# Patient Record
Sex: FEMALE | URBAN - NONMETROPOLITAN AREA
[De-identification: names, ages, dates, MRNs, and addresses within clinical notes are randomized per-mention and may not be internally consistent; named-entity substitution may affect disease eponyms.]

---

## 2020-06-09 ENCOUNTER — APPOINTMENT (OUTPATIENT)
Dept: URBAN - NONMETROPOLITAN AREA CLINIC 39 | Age: 75
Setting detail: DERMATOLOGY
End: 2020-06-10

## 2020-06-09 DIAGNOSIS — L43.8 OTHER LICHEN PLANUS: ICD-10-CM

## 2020-06-09 PROCEDURE — OTHER ADDITIONAL NOTES: OTHER

## 2020-06-09 PROCEDURE — 99202 OFFICE O/P NEW SF 15 MIN: CPT

## 2020-06-09 PROCEDURE — OTHER MIPS QUALITY: OTHER

## 2020-06-09 PROCEDURE — OTHER COUNSELING: OTHER

## 2020-06-09 PROCEDURE — OTHER MEDICATION COUNSELING: OTHER

## 2020-06-09 PROCEDURE — OTHER PRESCRIPTION: OTHER

## 2020-06-09 ASSESSMENT — LOCATION DETAILED DESCRIPTION DERM
LOCATION DETAILED: LEFT DISTAL PRETIBIAL REGION
LOCATION DETAILED: RIGHT PROXIMAL PRETIBIAL REGION

## 2020-06-09 ASSESSMENT — LOCATION ZONE DERM: LOCATION ZONE: LEG

## 2020-06-09 ASSESSMENT — LOCATION SIMPLE DESCRIPTION DERM
LOCATION SIMPLE: RIGHT PRETIBIAL REGION
LOCATION SIMPLE: LEFT PRETIBIAL REGION

## 2020-06-09 NOTE — HPI: RASH
Additional History: Pt states “it’s been traveling around my body for the past 4 years.” Pt states “I had it on my chest first then it travelled to my left arm and now my legs.” Pt states her previous dermatologist, Dr. Alida Rios, at [place] did 2 biopsies, one shave biopsy and then the second time she did a punch biopsy. Pt states the biopsies were approximately 2 years ago. Pt states the biopsy came back as lichen planus. Additional History: Pt states “it’s been traveling around my body for the past 4 years.” Pt states “I had it on my chest first then it travelled to my left arm and now my legs.” Pt states her previous dermatologist, Dr. Alida Riso, at [place] did 2 biopsies, one shave biopsy and then the second time she did a punch biopsy. Pt states the biopsies were approximately 2 years ago. Pt states the biopsy came back as lichen planus.

## 2020-06-09 NOTE — PROCEDURE: MEDICATION COUNSELING
Xelnarinderz Pregnancy And Lactation Text: This medication is Pregnancy Category D and is not considered safe during pregnancy.  The risk during breast feeding is also uncertain.

## 2020-06-09 NOTE — PROCEDURE: ADDITIONAL NOTES
Additional Notes: Rash was punch biopsied by patient’s previous dermatologist, Dr. Alida Rios, on 04/03/19. Pathology report requested and reviewed with pt today. Pt report shows lichen planus. Patient states she has been treated in the past with IM Kenalog injections. Pt reports her rash never fully resolved but improved significantly. Pt states the rash began on her chest 2 years ago and has since presented on her left arm and now her legs. Pt states the rash has been on her legs for months. \\nPt to begin oral prednisone 60mg daily and follow up in 3 weeks. Will plan to wean pt slowly depending on improvement.
Detail Level: Detailed

## 2020-06-10 RX ORDER — PREDNISONE 20 MG/1
TABLET ORAL
Qty: 63 | Refills: 0 | Status: ERX | COMMUNITY
Start: 2020-06-10

## 2020-06-30 ENCOUNTER — RX ONLY (RX ONLY)
Age: 75
End: 2020-06-30

## 2020-06-30 RX ORDER — PREDNISONE 20 MG/1
TABLET ORAL
Qty: 21 | Refills: 0 | Status: ERX

## 2020-07-09 ENCOUNTER — APPOINTMENT (OUTPATIENT)
Dept: URBAN - NONMETROPOLITAN AREA CLINIC 39 | Age: 75
Setting detail: DERMATOLOGY
End: 2020-07-09

## 2020-07-09 DIAGNOSIS — L43.8 OTHER LICHEN PLANUS: ICD-10-CM

## 2020-07-09 DIAGNOSIS — B37.0 CANDIDAL STOMATITIS: ICD-10-CM

## 2020-07-09 PROCEDURE — OTHER COUNSELING: OTHER

## 2020-07-09 PROCEDURE — OTHER MIPS QUALITY: OTHER

## 2020-07-09 PROCEDURE — OTHER MEDICATION COUNSELING: OTHER

## 2020-07-09 PROCEDURE — OTHER PRESCRIPTION: OTHER

## 2020-07-09 PROCEDURE — 99213 OFFICE O/P EST LOW 20 MIN: CPT

## 2020-07-09 PROCEDURE — OTHER ADDITIONAL NOTES: OTHER

## 2020-07-09 RX ORDER — NYSTATIN 100000 [USP'U]/ML
SUSPENSION ORAL QID
Qty: 300 | Refills: 1 | Status: ERX | COMMUNITY
Start: 2020-07-09

## 2020-07-09 RX ORDER — PREDNISONE 20 MG/1
TABLET ORAL
Qty: 21 | Refills: 0 | Status: ERX

## 2020-07-09 ASSESSMENT — LOCATION SIMPLE DESCRIPTION DERM
LOCATION SIMPLE: LEFT PRETIBIAL REGION
LOCATION SIMPLE: RIGHT PRETIBIAL REGION

## 2020-07-09 ASSESSMENT — LOCATION DETAILED DESCRIPTION DERM
LOCATION DETAILED: LEFT DISTAL PRETIBIAL REGION
LOCATION DETAILED: RIGHT PROXIMAL PRETIBIAL REGION

## 2020-07-09 ASSESSMENT — LOCATION ZONE DERM: LOCATION ZONE: LEG

## 2020-07-09 NOTE — PROCEDURE: ADDITIONAL NOTES
Detail Level: Simple
Additional Notes: Lichen planus improved on exam today. Pt reports new onset “watery eyes” and “difficulty focusing” that began 3 weeks ago. Denies blurry vision, headache, eye pain. Pt states she has not seen her optometrist in 5 years. Pt reports she was told she may have “early signs of glaucoma” several years ago. Pt denies any other symptoms at this time. Unsure eye symptoms are related to prednisone but will wean patient off of prednisone at this time more quickly due to symptoms. Pt also instructed to follow up with optometrist. Pt verbalizes understanding and agreement.

## 2020-07-09 NOTE — PROCEDURE: MEDICATION COUNSELING
Daily Note     Today's date: 2018  Patient name: Hafsa Guzmán  : 2002  MRN: [id number]  Referring provider: Oskar Davila  Dx:   Encounter Diagnosis     ICD-10-CM    1  Chronic left shoulder pain M25 512     G89 29                   Subjective: Patient reports that she had some muscle soreness after last session, but it went away quickly  No pain on arrival to PT today         Objective: See treatment diary below  Precautions: None    Daily Treatment Diary     Manual  4/24/18 4/26/18 5/1/18 5/3/18 5/8/18 5/10/18 5/15/18 5/17/18 5/22/18    Joint Mob  L GH posterior, inferior Gr II L GH posterior, inferior Gr II L GH posterior, inferior Gr III L GH posterior, inferior Gr III L GH posterior, inferior Gr III NT L GH posterior, inferior Gr III NT    STM  L deltoids, UT, suboccipitals L deltoids, UT, suboccipitals L deltoids, UT, suboccipitals L deltoids, UT, suboccipitals L deltoids, UT, suboccipitals  L deltoids, UT, suboccipitals     MRE  L ER/IR R S  3x15" L ER/IR R S  3x15" L ER/IR R S  3x15 L ER/IR R S  3x15;  ER/IR 1x15 L ER/IR R S  3x15;  ER/IR 1x15  L ER/IR R S  3x15;  ER/IR 1x15     ROM  All directions L shoulder All directions L shoulder All directions L shoulder All directions L shoulder All directions L shoulder  All directions L shoulder                      Exercise Diary  4/24/18 4/26/18 5/1/18 5/3/18 5/8/18 5/10/18 5/15/18 5/17/18 5/22/18    Pulley flexion 3 min NT NT DC         Isometrics with ball 10"x10 ER/IR ER/IR walk-outs yellow 2x10 ER/IR walk-outs yellow 2x10 DC         UT stretch L 30"x3 L 30"x3 L 30"x3 NT NT        Wall walk with ball  x20  NT NT DC        UBE  F/R 4min each F/R 5min each F/R 5min each F/R 5min each F/R 5min each F/R 5min each F/R 5min each F/R 5min each    Cane flexion with back on wall  3x10 NT NT NT        Supine serratus punches  5" hold at 120 degrees 2x10 NT NT NT        Serratus punch plank   Shoulder taps on table 3x10 Shoulder taps on table 3x10 Shoulder taps on table 3x10 Shoulder taps on table 3x10 Shoulder taps on table 3x10 Shoulder taps on table 3x10 BOSU rockings S/S    Overhead PB toss    10x 8ft 10x 8ft 10x 8ft NT NT  NT    Wall walks with TB   yellow S/S x5, U/D 10x yellow S/S x5, U/D 10x yellow S/S x5, U/D 10x Red S/S x5, U/D 10x Star Drill 2x5 B  Red S/S x5, U/D 10x    "Breaking Bread"   Yellow 2x10 Yellow 2x10 NT NT NT      Foam roll wall walk up with TB at wrists    Red  3x10 Red 3x10 NT NT      TB ER/IR    3x10 yellow 3x10 Red TB Yellow Tcord  3x10 Yellow Tcord  3x15 Yellow Tcord  3x15 Row with ER at 90/90Yellow Tcord  2x10    scaption    2# 3x10 2# 3x10 NT 4# 3x10 4# 3x10 NT    Prone Y's on PB      3x10 NT 3x10 Y's, T's, W's NT    "Fists of Fury"      Red 2x10 Red 2x10 Red 2x10 NT    BOSU plank work       [employer] S/S 3x5 NT SCANEverlane, 2x20 rocking S/S                                               Modalities   4/26/18           CP  10 min L shoulder                                             Assessment: Tolerated treatment well and without distress  She continues to have some decreased strength of scapular stabilizers in addition to poor endurance  She also requires intermittent verbal and tactile cues to avoid L UT compensation when going into overhead position  Patient demonstrated fatigue post treatment, exhibited good technique with therapeutic exercises and would benefit from continued PT      Plan: Continue per plan of care  Tremfya Counseling: I discussed with the patient the risks of guselkumab including but not limited to immunosuppression, serious infections, worsening of inflammatory bowel disease and drug reactions.  The patient understands that monitoring is required including a PPD at baseline and must alert us or the primary physician if symptoms of infection or other concerning signs are noted.

## 2020-08-21 ENCOUNTER — APPOINTMENT (OUTPATIENT)
Dept: URBAN - NONMETROPOLITAN AREA CLINIC 39 | Age: 75
Setting detail: DERMATOLOGY
End: 2020-08-24

## 2020-08-21 DIAGNOSIS — L43.8 OTHER LICHEN PLANUS: ICD-10-CM

## 2020-08-21 DIAGNOSIS — B37.0 CANDIDAL STOMATITIS: ICD-10-CM

## 2020-08-21 PROCEDURE — 99213 OFFICE O/P EST LOW 20 MIN: CPT

## 2020-08-21 PROCEDURE — OTHER ADDITIONAL NOTES: OTHER

## 2020-08-21 PROCEDURE — OTHER MIPS QUALITY: OTHER

## 2020-08-21 PROCEDURE — OTHER COUNSELING: OTHER

## 2020-08-21 ASSESSMENT — LOCATION SIMPLE DESCRIPTION DERM
LOCATION SIMPLE: LEFT PRETIBIAL REGION
LOCATION SIMPLE: RIGHT PRETIBIAL REGION

## 2020-08-21 ASSESSMENT — LOCATION ZONE DERM: LOCATION ZONE: LEG

## 2020-08-21 ASSESSMENT — LOCATION DETAILED DESCRIPTION DERM
LOCATION DETAILED: RIGHT PROXIMAL PRETIBIAL REGION
LOCATION DETAILED: LEFT DISTAL PRETIBIAL REGION

## 2020-08-21 NOTE — PROCEDURE: ADDITIONAL NOTES
Detail Level: Zone
Additional Notes: Prednisone tapered quickly due to reported eye symptoms at last visit. Patient states she was evaluated by an ophthalmologist and has cataracts. Patient is scheduled for cataract surgery in a few weeks. Lichen planus significantly improved on exam today with only post-inflammatory hyperpigmentation appreciated. Patient to contact office of rash recurs.
denies pain/discomfort

## 2021-05-04 NOTE — PROCEDURE: MEDICATION COUNSELING
Elidel Counseling: Patient may experience a mild burning sensation during topical application. Elidel is not approved in children less than 2 years of age. There have been case reports of hematologic and skin malignancies in patients using topical calcineurin inhibitors although causality is questionable. Name band;

## 2021-07-07 NOTE — PROCEDURE: MEDICATION COUNSELING
After review by Breast Center Radiologist, Dr. William Vaca, Ms. 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  Biopsy site without issues or concerns.   I encouraged her to contact her doctor with any further breast concerns.   Spiral Flap Text: The defect edges were debeveled with a #15 scalpel blade.  Given the location of the defect, shape of the defect and the proximity to free margins a spiral flap was deemed most appropriate.  Using a sterile surgical marker, an appropriate rotation flap was drawn incorporating the defect and placing the expected incisions within the relaxed skin tension lines where possible. The area thus outlined was incised deep to adipose tissue with a #15 scalpel blade.  The skin margins were undermined to an appropriate distance in all directions utilizing iris scissors. Benzoyl Peroxide Pregnancy And Lactation Text: This medication is Pregnancy Category C. It is unknown if benzoyl peroxide is excreted in breast milk.

## 2022-03-10 NOTE — PROCEDURE: MEDICATION COUNSELING
CC:  Martine Vasquez is here today for Video Visit and Medication Management       Medications: medications verified, no change  Refills needed today?  NO  denies Latex allergy or sensitivity  Patient would like communication of their results via:      Cell Phone:   Telephone Information:   Mobile [phone]     Okay to leave a message containing results? Yes  Tobacco history: verified  Advanced directives: No        Patient's current [facility] status: Active.  Health Maintenance Due   Topic Date Due   • COVID-19 Vaccine (1) Never done   • Meningococcal Serogroup B Vaccine (2 of 2 - Risk Bexsero 2-dose series) 10/25/2020   • Influenza Vaccine (1) 09/01/2021     Patient is due for topics as listed above but is not proceeding with Immunization(s) Influenza and Meningococcal Serogroup B at this time.                  Tranexamic Acid Pregnancy And Lactation Text: It is unknown if this medication is safe during pregnancy or breast feeding.